# Patient Record
Sex: FEMALE | Race: WHITE | Employment: UNEMPLOYED | URBAN - METROPOLITAN AREA
[De-identification: names, ages, dates, MRNs, and addresses within clinical notes are randomized per-mention and may not be internally consistent; named-entity substitution may affect disease eponyms.]

---
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
## 2024-02-19 ENCOUNTER — TELEPHONE (OUTPATIENT)
Dept: FAMILY MEDICINE CLINIC | Age: 19
End: 2024-02-19

## 2024-02-20 ENCOUNTER — OFFICE VISIT (OUTPATIENT)
Dept: FAMILY MEDICINE CLINIC | Age: 19
End: 2024-02-20

## 2024-02-20 VITALS
WEIGHT: 167.3 LBS | HEIGHT: 65 IN | BODY MASS INDEX: 27.88 KG/M2 | HEART RATE: 104 BPM | SYSTOLIC BLOOD PRESSURE: 100 MMHG | DIASTOLIC BLOOD PRESSURE: 80 MMHG | RESPIRATION RATE: 97 BRPM

## 2024-02-20 DIAGNOSIS — K21.00 GASTROESOPHAGEAL REFLUX DISEASE WITH ESOPHAGITIS WITHOUT HEMORRHAGE: ICD-10-CM

## 2024-02-20 DIAGNOSIS — K20.0 EOSINOPHILIC ESOPHAGITIS: ICD-10-CM

## 2024-02-20 DIAGNOSIS — J45.40 MODERATE PERSISTENT ASTHMA WITHOUT COMPLICATION: ICD-10-CM

## 2024-02-20 DIAGNOSIS — F41.9 ANXIETY: Primary | ICD-10-CM

## 2024-02-20 DIAGNOSIS — Z59.9 FINANCIAL DIFFICULTIES: ICD-10-CM

## 2024-02-20 DIAGNOSIS — F32.A DEPRESSION, UNSPECIFIED DEPRESSION TYPE: ICD-10-CM

## 2024-02-20 DIAGNOSIS — J30.89 NON-SEASONAL ALLERGIC RHINITIS, UNSPECIFIED TRIGGER: ICD-10-CM

## 2024-02-20 PROCEDURE — 99213 OFFICE O/P EST LOW 20 MIN: CPT | Performed by: PHYSICIAN ASSISTANT

## 2024-02-20 RX ORDER — OMEPRAZOLE 20 MG/1
20 CAPSULE, DELAYED RELEASE ORAL 2 TIMES DAILY
Qty: 180 CAPSULE | Refills: 0 | Status: SHIPPED | OUTPATIENT
Start: 2024-02-20

## 2024-02-20 RX ORDER — ALBUTEROL SULFATE 90 UG/1
2 AEROSOL, METERED RESPIRATORY (INHALATION) EVERY 6 HOURS PRN
Qty: 1 EACH | Refills: 5 | Status: SHIPPED | OUTPATIENT
Start: 2024-02-20

## 2024-02-20 RX ORDER — MONTELUKAST SODIUM 10 MG/1
10 TABLET ORAL NIGHTLY
Qty: 90 TABLET | Refills: 1 | Status: SHIPPED | OUTPATIENT
Start: 2024-02-20

## 2024-02-20 RX ORDER — PAROXETINE 10 MG/1
10 TABLET, FILM COATED ORAL DAILY
Qty: 90 TABLET | Refills: 1 | Status: SHIPPED | OUTPATIENT
Start: 2024-02-20

## 2024-02-20 RX ORDER — FLUTICASONE PROPIONATE 50 MCG
SPRAY, SUSPENSION (ML) NASAL
Qty: 16 G | Refills: 0 | Status: SHIPPED | OUTPATIENT
Start: 2024-02-20

## 2024-02-20 SDOH — ECONOMIC STABILITY - INCOME SECURITY: PROBLEM RELATED TO HOUSING AND ECONOMIC CIRCUMSTANCES, UNSPECIFIED: Z59.9

## 2024-02-20 ASSESSMENT — PATIENT HEALTH QUESTIONNAIRE - PHQ9
1. LITTLE INTEREST OR PLEASURE IN DOING THINGS: 0
7. TROUBLE CONCENTRATING ON THINGS, SUCH AS READING THE NEWSPAPER OR WATCHING TELEVISION: 0
10. IF YOU CHECKED OFF ANY PROBLEMS, HOW DIFFICULT HAVE THESE PROBLEMS MADE IT FOR YOU TO DO YOUR WORK, TAKE CARE OF THINGS AT HOME, OR GET ALONG WITH OTHER PEOPLE: 0
SUM OF ALL RESPONSES TO PHQ QUESTIONS 1-9: 0
4. FEELING TIRED OR HAVING LITTLE ENERGY: 0
SUM OF ALL RESPONSES TO PHQ QUESTIONS 1-9: 0
2. FEELING DOWN, DEPRESSED OR HOPELESS: 0
8. MOVING OR SPEAKING SO SLOWLY THAT OTHER PEOPLE COULD HAVE NOTICED. OR THE OPPOSITE, BEING SO FIGETY OR RESTLESS THAT YOU HAVE BEEN MOVING AROUND A LOT MORE THAN USUAL: 0
SUM OF ALL RESPONSES TO PHQ QUESTIONS 1-9: 0
5. POOR APPETITE OR OVEREATING: 0
SUM OF ALL RESPONSES TO PHQ QUESTIONS 1-9: 0
9. THOUGHTS THAT YOU WOULD BE BETTER OFF DEAD, OR OF HURTING YOURSELF: 0
6. FEELING BAD ABOUT YOURSELF - OR THAT YOU ARE A FAILURE OR HAVE LET YOURSELF OR YOUR FAMILY DOWN: 0
SUM OF ALL RESPONSES TO PHQ9 QUESTIONS 1 & 2: 0
3. TROUBLE FALLING OR STAYING ASLEEP: 0

## 2024-02-20 ASSESSMENT — ENCOUNTER SYMPTOMS
CHEST TIGHTNESS: 1
WHEEZING: 0
SHORTNESS OF BREATH: 0

## 2024-02-20 NOTE — PATIENT INSTRUCTIONS

## 2024-02-20 NOTE — PROGRESS NOTES
Marlen Win (:  2005) is a 18 y.o. female,Established patient, here for evaluation of the following chief complaint(s):  Annual Exam         ASSESSMENT/PLAN:  1. Anxiety  -     PARoxetine (PAXIL) 10 MG tablet; Take 1 tablet by mouth daily, Disp-90 tablet, R-1Normal  -    uncontrolled, pt is not interested in medication at this time and would like to reach out to therapist  2. Depression, unspecified depression type  -     PARoxetine (PAXIL) 10 MG tablet; Take 1 tablet by mouth daily, Disp-90 tablet, R-1Normal  -    uncontrolled, pt is not interested in medication at this time and would like to reach out to therapist  3. Eosinophilic esophagitis  -     omeprazole (PRILOSEC) 20 MG delayed release capsule; Take 1 capsule by mouth 2 times daily, Disp-180 capsule, R-0Normal  4. Gastroesophageal reflux disease with esophagitis without hemorrhage  -     omeprazole (PRILOSEC) 20 MG delayed release capsule; Take 1 capsule by mouth 2 times daily, Disp-180 capsule, R-0Normal  5. Non-seasonal allergic rhinitis, unspecified trigger  -     montelukast (SINGULAIR) 10 MG tablet; Take 1 tablet by mouth nightly, Disp-90 tablet, R-1Normal  -     fluticasone (FLONASE) 50 MCG/ACT nasal spray; Use 1 spray(s) in each nostril once daily, Disp-16 g, R-0Normal  6. Moderate persistent asthma without complication  -     albuterol sulfate HFA (PROVENTIL;VENTOLIN;PROAIR) 108 (90 Base) MCG/ACT inhaler; Inhale 2 puffs into the lungs every 6 hours as needed for Wheezing, Disp-1 each, R-5Normal  -     beclomethasone (QVAR REDIHALER) 80 MCG/ACT AERB inhaler; Inhale 1 puff into the lungs in the morning and 1 puff in the evening., Disp-1 each, R-2Normal  -  flovent is no longer being made, will send in qvar for her to try at the pharmacy. She is having difficulty affording medication, I will get her connected with social work  7. Financial difficulties  -     [facility]      Return in about 6 months (around

## 2024-02-22 ENCOUNTER — TELEPHONE (OUTPATIENT)
Dept: FAMILY MEDICINE CLINIC | Age: 19
End: 2024-02-22

## 2024-02-22 NOTE — TELEPHONE ENCOUNTER
SW made 1st call attempt to reach patient to follow-up on [facility] referral from PCP.  Sw was unable to reach pt and left message explaining reason for call as well as contact number to return call.  PLAN: SW will attempt to reach pt another day if pt does not return call to SW.

## 2024-02-23 NOTE — TELEPHONE ENCOUNTER
[facility] Social Work Note    Reason for Referral   Behavioral Health - Substance Use Disorder / Stressors / Coping    Plan:    Pt will contact intake at [facility] at [phone] to request services for counseling for her ODD.      Pt will contact SW if needs additional resources.      Assessment/Summary:    SW contacted pt to return her call.  Pt related she has OCD and is on medication for anxiety through her PCP.  Pt related she would like counseling for her OCD.  Pt stated she had seen Nasir Khan at [facility] but is no longer at [facility]. Pt expressed she would like in person therapy rather than over the phone.  Pt stated she would rather have in person services.  SW provided pt with information about [facility] and how to reach agency for services.  SW also suggested [facility] in [place] if [facility] does not work out and how pt can obtain transportation to agencies through her [employer] insurance.  Pt is aware to contact SW if can assist her further with additional resources.      Interventions:    SW assessed pt's needs and barriers.    SW identified how pt wanted help with individual therapy and prefers in person therapy.    SW provided pt with information about [facility] and to call [phone] or to contact [facility] at [phone].    SW informed pt to reach back out to  if she needs further assistance.        Future Appointments   Date Time Provider Department Center   8/20/2024  1:30 PM Alanis Oglesby PA [facility] [place] [facility]

## 2024-03-07 NOTE — TELEPHONE ENCOUNTER
SW spoke with pt to follow-up and see if pt had reached out to agencies SW provided to her for therapy services.  PT related she had and was waiting to hear back from [facility].  PLAN: Pt related she would contact SW if needs further assistance if she does not receive services through [facility].

## 2024-05-15 DIAGNOSIS — K21.00 GASTROESOPHAGEAL REFLUX DISEASE WITH ESOPHAGITIS WITHOUT HEMORRHAGE: ICD-10-CM

## 2024-05-15 DIAGNOSIS — K20.0 EOSINOPHILIC ESOPHAGITIS: ICD-10-CM

## 2024-05-15 RX ORDER — OMEPRAZOLE 20 MG/1
20 CAPSULE, DELAYED RELEASE ORAL 2 TIMES DAILY
Qty: 180 CAPSULE | Refills: 1 | Status: SHIPPED | OUTPATIENT
Start: 2024-05-15

## 2024-05-15 NOTE — TELEPHONE ENCOUNTER
Refill Request     CONFIRM preferred pharmacy with the patient.    If Mail Order Rx - Pend for 90 day refill.      Last Seen: Last Seen Department: 2/20/2024  Last Seen by PCP: 2/20/2024    Last Written: 2/20/24 180 with no refills     If no future appointment scheduled:  Review the last OV with PCP and review information for follow-up visit,  Route STAFF MESSAGE with patient name to the  Pool for scheduling with the following information:            -  Timing of next visit           -  Visit type ie Physical, OV, etc           -  Diagnoses/Reason ie. COPD, HTN - Do not use MEDICATION, Follow-up or CHECK UP - Give reason for visit      Next Appointment:   Future Appointments   Date Time Provider Department Center   8/20/2024  1:30 PM Alanis Oglesby PA [facility]  [place] [facility]       Message sent to  to schedule appt with patient?  NO      Requested Prescriptions     Pending Prescriptions Disp Refills    omeprazole (PRILOSEC) 20 MG delayed release capsule [Pharmacy Med Name: Omeprazole 20 MG Oral Capsule Delayed Release] 180 capsule 0     Sig: Take 1 capsule by mouth twice daily        1 Principal Discharge DX:	 delivery delivered  Assessment and plan of treatment:	please follow up in 1 week for an incision check and 6 weeks for a postpartum visit

## 2024-05-16 ENCOUNTER — PATIENT MESSAGE (OUTPATIENT)
Dept: FAMILY MEDICINE CLINIC | Age: 19
End: 2024-05-16

## 2024-05-17 NOTE — TELEPHONE ENCOUNTER
From: Marlen Win  To: Alanis Oglesby  Sent: 5/16/2024 10:26 PM EDT  Subject: Daily inhaler change     Melo Thapa my insurance company will not paid for my daily inhaler so could you please call in something else for me thank you Marlen Win

## 2024-05-17 NOTE — TELEPHONE ENCOUNTER
Called pharmacy, spoke with meka she stated that when they run it through insurance they are covering it there is a $0.00 cost for patient  Albuterol HSA 90 mcg for 1 box is being covered

## 2024-08-14 DIAGNOSIS — J30.89 NON-SEASONAL ALLERGIC RHINITIS, UNSPECIFIED TRIGGER: ICD-10-CM

## 2024-08-14 RX ORDER — MONTELUKAST SODIUM 10 MG/1
10 TABLET ORAL NIGHTLY
Qty: 90 TABLET | Refills: 1 | Status: SHIPPED | OUTPATIENT
Start: 2024-08-14

## 2024-08-14 NOTE — TELEPHONE ENCOUNTER
Refill Request     CONFIRM preferred pharmacy with the patient.    If Mail Order Rx - Pend for 90 day refill.      Last Seen: Last Seen Department: 2/20/2024  Last Seen by PCP: 2/20/2024    Last Written: 2/20/24 #90 refills 1    If no future appointment scheduled:  Review the last OV with PCP and review information for follow-up visit,  Route STAFF MESSAGE with patient name to the  Pool for scheduling with the following information:            -  Timing of next visit           -  Visit type ie Physical, OV, etc           -  Diagnoses/Reason ie. COPD, HTN - Do not use MEDICATION, Follow-up or CHECK UP - Give reason for visit      Next Appointment:   Future Appointments   Date Time Provider Department Center   8/20/2024  1:30 PM Alanis Oglesby PA [facility]       Message sent to  to schedule appt with patient?  YES  Return in about 6 months (around 8/20/2024) for Asthma.     Requested Prescriptions     Pending Prescriptions Disp Refills    montelukast (SINGULAIR) 10 MG tablet [Pharmacy Med Name: Montelukast Sodium 10 MG Oral Tablet] 90 tablet 0     Sig: Take 1 tablet by mouth nightly

## 2024-08-19 SDOH — ECONOMIC STABILITY: FOOD INSECURITY: WITHIN THE PAST 12 MONTHS, YOU WORRIED THAT YOUR FOOD WOULD RUN OUT BEFORE YOU GOT MONEY TO BUY MORE.: NEVER TRUE

## 2024-08-19 SDOH — ECONOMIC STABILITY: FOOD INSECURITY: WITHIN THE PAST 12 MONTHS, THE FOOD YOU BOUGHT JUST DIDN'T LAST AND YOU DIDN'T HAVE MONEY TO GET MORE.: NEVER TRUE

## 2024-08-19 SDOH — ECONOMIC STABILITY: INCOME INSECURITY: HOW HARD IS IT FOR YOU TO PAY FOR THE VERY BASICS LIKE FOOD, HOUSING, MEDICAL CARE, AND HEATING?: NOT HARD AT ALL

## 2024-08-20 ENCOUNTER — OFFICE VISIT (OUTPATIENT)
Dept: FAMILY MEDICINE CLINIC | Age: 19
End: 2024-08-20
Payer: MEDICAID

## 2024-08-20 VITALS
SYSTOLIC BLOOD PRESSURE: 110 MMHG | HEART RATE: 86 BPM | WEIGHT: 169.4 LBS | BODY MASS INDEX: 28.57 KG/M2 | OXYGEN SATURATION: 99 % | DIASTOLIC BLOOD PRESSURE: 60 MMHG

## 2024-08-20 DIAGNOSIS — J45.40 MODERATE PERSISTENT ASTHMA WITHOUT COMPLICATION: ICD-10-CM

## 2024-08-20 DIAGNOSIS — J30.89 NON-SEASONAL ALLERGIC RHINITIS, UNSPECIFIED TRIGGER: ICD-10-CM

## 2024-08-20 DIAGNOSIS — F41.9 ANXIETY: Primary | ICD-10-CM

## 2024-08-20 DIAGNOSIS — F32.A DEPRESSION, UNSPECIFIED DEPRESSION TYPE: ICD-10-CM

## 2024-08-20 DIAGNOSIS — H65.92 FLUID LEVEL BEHIND TYMPANIC MEMBRANE OF LEFT EAR: ICD-10-CM

## 2024-08-20 PROCEDURE — G8427 DOCREV CUR MEDS BY ELIG CLIN: HCPCS | Performed by: PHYSICIAN ASSISTANT

## 2024-08-20 PROCEDURE — 99214 OFFICE O/P EST MOD 30 MIN: CPT | Performed by: PHYSICIAN ASSISTANT

## 2024-08-20 PROCEDURE — 1036F TOBACCO NON-USER: CPT | Performed by: PHYSICIAN ASSISTANT

## 2024-08-20 PROCEDURE — 90471 IMMUNIZATION ADMIN: CPT | Performed by: PHYSICIAN ASSISTANT

## 2024-08-20 PROCEDURE — 90677 PCV20 VACCINE IM: CPT | Performed by: PHYSICIAN ASSISTANT

## 2024-08-20 PROCEDURE — G8419 CALC BMI OUT NRM PARAM NOF/U: HCPCS | Performed by: PHYSICIAN ASSISTANT

## 2024-08-20 RX ORDER — FLUTICASONE PROPIONATE 50 MCG
SPRAY, SUSPENSION (ML) NASAL
Qty: 16 G | Refills: 0 | Status: SHIPPED | OUTPATIENT
Start: 2024-08-20

## 2024-08-20 RX ORDER — METHYLPREDNISOLONE 4 MG/1
TABLET ORAL
Qty: 1 KIT | Refills: 0 | Status: SHIPPED | OUTPATIENT
Start: 2024-08-20

## 2024-08-20 RX ORDER — ALBUTEROL SULFATE 90 UG/1
2 AEROSOL, METERED RESPIRATORY (INHALATION) EVERY 6 HOURS PRN
Qty: 1 EACH | Refills: 5 | Status: SHIPPED | OUTPATIENT
Start: 2024-08-20

## 2024-08-20 RX ORDER — PAROXETINE 10 MG/1
10 TABLET, FILM COATED ORAL DAILY
Qty: 90 TABLET | Refills: 1 | Status: SHIPPED | OUTPATIENT
Start: 2024-08-20

## 2024-08-20 ASSESSMENT — ENCOUNTER SYMPTOMS
SINUS PRESSURE: 1
RHINORRHEA: 0
SINUS PAIN: 0
COUGH: 0
SORE THROAT: 0

## 2024-08-20 NOTE — ASSESSMENT & PLAN NOTE
Borderline controlled, pt does not want to increase paxil at this time, will continue with paxil and therapy and follow up in 6 months    Orders:    PARoxetine (PAXIL) 10 MG tablet; Take 1 tablet by mouth daily

## 2024-08-20 NOTE — ASSESSMENT & PLAN NOTE
At goal, continue with qvar and albuterol prn, follow up with me in 6 months    Orders:    albuterol sulfate HFA (PROVENTIL;VENTOLIN;PROAIR) 108 (90 Base) MCG/ACT inhaler; Inhale 2 puffs into the lungs every 6 hours as needed for Wheezing

## 2024-08-20 NOTE — ASSESSMENT & PLAN NOTE
Well-controlled, continue current medications and continue with flonase and singulair, follow up in 6 months    Orders:    fluticasone (FLONASE) 50 MCG/ACT nasal spray; Use 1 spray(s) in each nostril once daily

## 2024-08-20 NOTE — PROGRESS NOTES
Marlen Win (:  2005) is a 19 y.o. female,Established patient, here for evaluation of the following chief complaint(s):  Asthma, Otalgia (On and off ), and Dizziness (On and off )         Assessment & Plan  Anxiety   Borderline controlled, pt does not want to increase paxil at this time, will continue with paxil and therapy and follow up in 6 months    Orders:    PARoxetine (PAXIL) 10 MG tablet; Take 1 tablet by mouth daily    Moderate persistent asthma without complication   At goal, continue with qvar and albuterol prn, follow up with me in 6 months    Orders:    albuterol sulfate HFA (PROVENTIL;VENTOLIN;PROAIR) 108 (90 Base) MCG/ACT inhaler; Inhale 2 puffs into the lungs every 6 hours as needed for Wheezing    Depression, unspecified depression type   Borderline controlled, pt does not want to increase paxil at this time. We will continue with paxil and therapy    Orders:    PARoxetine (PAXIL) 10 MG tablet; Take 1 tablet by mouth daily    Non-seasonal allergic rhinitis, unspecified trigger   Well-controlled, continue current medications and continue with flonase and singulair, follow up in 6 months    Orders:    fluticasone (FLONASE) 50 MCG/ACT nasal spray; Use 1 spray(s) in each nostril once daily    Fluid level behind tympanic membrane of left ear    Start medrol dose pack, let me know if there is no change and we can add on an antibiotic           Return in about 6 months (around 2025) for Asthma.       Subjective   HPI  Mood:  Current medication: paxil 10 mg  Side effects: none  Current symptoms: OCD, anxiety, panic attacks ( 1-2 times per month), trouble falling asleep, slight sadness  Denies: self harming, SI/HI, decreased motivation  She is seeing Jessi weekly for therapy     Asthma:  Current medication: albuterol, flonase, singulair, qvar  Aggravated by allergies  Albuterol inhaler: a couple times per week  Nocturnal symptoms: none  Current symptoms: chest tightness    Right ear

## 2024-08-20 NOTE — ASSESSMENT & PLAN NOTE
Borderline controlled, pt does not want to increase paxil at this time. We will continue with paxil and therapy    Orders:    PARoxetine (PAXIL) 10 MG tablet; Take 1 tablet by mouth daily

## 2024-08-21 ASSESSMENT — ENCOUNTER SYMPTOMS
WHEEZING: 0
SHORTNESS OF BREATH: 0

## 2024-11-07 DIAGNOSIS — K20.0 EOSINOPHILIC ESOPHAGITIS: ICD-10-CM

## 2024-11-07 DIAGNOSIS — K21.00 GASTROESOPHAGEAL REFLUX DISEASE WITH ESOPHAGITIS WITHOUT HEMORRHAGE: ICD-10-CM

## 2024-11-07 NOTE — TELEPHONE ENCOUNTER
Refill Request     CONFIRM preferred pharmacy with the patient.    If Mail Order Rx - Pend for 90 day refill.      Last Seen: Last Seen Department: 8/20/2024  Last Seen by PCP: 8/20/2024    Last Written: 5/15/2024    If no future appointment scheduled:  Review the last OV with PCP and review information for follow-up visit,  Route STAFF MESSAGE with patient name to the  Pool for scheduling with the following information:            -  Timing of next visit           -  Visit type ie Physical, OV, etc           -  Diagnoses/Reason ie. COPD, HTN - Do not use MEDICATION, Follow-up or CHECK UP - Give reason for visit      Next Appointment:   No future appointments.    Message sent to  to schedule appt with patient?  NO      Requested Prescriptions     Pending Prescriptions Disp Refills    omeprazole (PRILOSEC) 20 MG delayed release capsule [Pharmacy Med Name: OMEPRAZOLE 20MG CAP] 180 capsule 1     Sig: Take 1 capsule by mouth twice daily

## 2024-11-10 NOTE — TELEPHONE ENCOUNTER
Refill Request     CONFIRM preferred pharmacy with the patient.    If Mail Order Rx - Pend for 90 day refill.      Last Seen: Last Seen Department: 8/20/2024  Last Seen by PCP: 8/20/2024    Last Written: 5/20/24 10.6g 2 refills    If no future appointment scheduled:  Review the last OV with PCP and review information for follow-up visit,  Route STAFF MESSAGE with patient name to the  Pool for scheduling with the following information:            -  Timing of next visit           -  Visit type ie Physical, OV, etc           -  Diagnoses/Reason ie. COPD, HTN - Do not use MEDICATION, Follow-up or CHECK UP - Give reason for visit      Next Appointment:   No future appointments.    Message sent to  to schedule appt with patient?  YES  Return in about 6 months (around 2/20/2025) for Asthma.        Requested Prescriptions     Pending Prescriptions Disp Refills    beclomethasone (QVAR REDIHALER) 40 MCG/ACT AERB inhaler [Pharmacy Med Name: Qvar RediHaler 40 MCG/ACT Inhalation Aerosol Breath Activated] 11 g 0     Sig: INHALE 1 PUFF IN THE MORNING AND 1 IN THE EVENING

## 2025-01-24 DIAGNOSIS — J45.40 MODERATE PERSISTENT ASTHMA WITHOUT COMPLICATION: ICD-10-CM

## 2025-01-24 RX ORDER — ALBUTEROL SULFATE 90 UG/1
2 INHALANT RESPIRATORY (INHALATION) EVERY 6 HOURS PRN
Qty: 9 G | Refills: 0 | Status: SHIPPED | OUTPATIENT
Start: 2025-01-24

## 2025-01-24 NOTE — TELEPHONE ENCOUNTER
.Refill Request     CONFIRM preferred pharmacy with the patient.    If Mail Order Rx - Pend for 90 day refill.      Last Seen: Last Seen Department: 8/20/2024  Last Seen by PCP: 8/20/2024    Last Written: 8-20-24 1 with 5     If no future appointment scheduled:  Review the last OV with PCP and review information for follow-up visit,  Route STAFF MESSAGE with patient name to the  Pool for scheduling with the following information:            -  Timing of next visit           -  Visit type ie Physical, OV, etc           -  Diagnoses/Reason ie. COPD, HTN - Do not use MEDICATION, Follow-up or CHECK UP - Give reason for visit      Next Appointment:   Future Appointments   Date Time Provider Department Center   2/17/2025  1:30 PM Alanis Oglesby PA [facility] DE[facility]       Message sent to  to schedule appt with patient?  NO      Requested Prescriptions     Pending Prescriptions Disp Refills    albuterol sulfate HFA (PROVENTIL;VENTOLIN;PROAIR) 108 (90 Base) MCG/ACT inhaler [Pharmacy Med Name: Albuterol Sulfate  (90 Base) MCG/ACT Inhalation Aerosol Solution] 9 g 0     Sig: INHALE 2 PUFFS BY MOUTH EVERY 6 HOURS AS NEEDED FOR WHEEZING

## 2025-02-05 DIAGNOSIS — J30.89 NON-SEASONAL ALLERGIC RHINITIS, UNSPECIFIED TRIGGER: ICD-10-CM

## 2025-02-05 RX ORDER — MONTELUKAST SODIUM 10 MG/1
10 TABLET ORAL NIGHTLY
Qty: 90 TABLET | Refills: 1 | Status: SHIPPED | OUTPATIENT
Start: 2025-02-05

## 2025-02-05 NOTE — TELEPHONE ENCOUNTER
.Refill Request     CONFIRM preferred pharmacy with the patient.    If Mail Order Rx - Pend for 90 day refill.      Last Seen: Last Seen Department: 8/20/2024  Last Seen by PCP: 8/20/2024    Last Written: 8-14-24 90 with 1     If no future appointment scheduled:  Review the last OV with PCP and review information for follow-up visit,  Route STAFF MESSAGE with patient name to the  Pool for scheduling with the following information:            -  Timing of next visit           -  Visit type ie Physical, OV, etc           -  Diagnoses/Reason ie. COPD, HTN - Do not use MEDICATION, Follow-up or CHECK UP - Give reason for visit      Next Appointment:   Future Appointments   Date Time Provider Department Center   2/17/2025  1:30 PM Alanis Oglesby PA [facility] DEP       Message sent to  to schedule appt with patient?  NO      Requested Prescriptions     Pending Prescriptions Disp Refills    montelukast (SINGULAIR) 10 MG tablet [Pharmacy Med Name: Montelukast Sodium 10 MG Oral Tablet] 90 tablet 1     Sig: Take 1 tablet by mouth nightly

## 2025-02-08 DIAGNOSIS — J45.40 MODERATE PERSISTENT ASTHMA WITHOUT COMPLICATION: Primary | ICD-10-CM

## 2025-02-08 NOTE — TELEPHONE ENCOUNTER
Refill Request     CONFIRM preferred pharmacy with the patient.    If Mail Order Rx - Pend for 90 day refill.      Last Seen: Last Seen Department: 8/20/2024  Last Seen by PCP: 8/20/2024    Last Written: 11/11/2024 Qvar 10.6g with 2 refills    If no future appointment scheduled:  Review the last OV with PCP and review information for follow-up visit,  Route STAFF MESSAGE with patient name to the  Pool for scheduling with the following information:            -  Timing of next visit           -  Visit type ie Physical, OV, etc           -  Diagnoses/Reason ie. COPD, HTN - Do not use MEDICATION, Follow-up or CHECK UP - Give reason for visit      Next Appointment:   Future Appointments   Date Time Provider Department Center   2/17/2025  1:30 PM Alanis Oglesby PA [facility] DE[facility]       Message sent to  to schedule appt with patient?  NO      Requested Prescriptions     Pending Prescriptions Disp Refills    beclomethasone (QVAR REDIHALER) 40 MCG/ACT AERB inhaler [Pharmacy Med Name: Qvar RediHaler 40 MCG/ACT Inhalation Aerosol Breath Activated] 10.6 g 2     Sig: INHALE 1 PUFF BY MOUTH IN THE MORNING AND 1 IN THE EVENING

## 2025-02-14 DIAGNOSIS — J45.40 MODERATE PERSISTENT ASTHMA WITHOUT COMPLICATION: ICD-10-CM

## 2025-02-14 RX ORDER — ALBUTEROL SULFATE 90 UG/1
2 INHALANT RESPIRATORY (INHALATION) EVERY 6 HOURS PRN
Qty: 9 G | Refills: 4 | Status: SHIPPED | OUTPATIENT
Start: 2025-02-14

## 2025-02-14 NOTE — TELEPHONE ENCOUNTER
.Refill Request     CONFIRM preferred pharmacy with the patient.    If Mail Order Rx - Pend for 90 day refill.      Last Seen: Last Seen Department: 8/20/2024  Last Seen by PCP: 8/20/2024    Last Written: 1-24-25 9g with 0     If no future appointment scheduled:  Review the last OV with PCP and review information for follow-up visit,  Route STAFF MESSAGE with patient name to the  Pool for scheduling with the following information:            -  Timing of next visit           -  Visit type ie Physical, OV, etc           -  Diagnoses/Reason ie. COPD, HTN - Do not use MEDICATION, Follow-up or CHECK UP - Give reason for visit      Next Appointment:   Future Appointments   Date Time Provider Department Center   2/17/2025  1:30 PM Alanis Oglesby PA [facility] DE[facility]       Message sent to  to schedule appt with patient?  NO      Requested Prescriptions     Pending Prescriptions Disp Refills    albuterol sulfate HFA (PROVENTIL;VENTOLIN;PROAIR) 108 (90 Base) MCG/ACT inhaler [Pharmacy Med Name: Albuterol Sulfate  (90 Base) MCG/ACT Inhalation Aerosol Solution] 9 g 0     Sig: INHALE 2 PUFFS BY MOUTH EVERY 6 HOURS AS NEEDED FOR WHEEZING

## 2025-02-16 SDOH — ECONOMIC STABILITY: FOOD INSECURITY: WITHIN THE PAST 12 MONTHS, YOU WORRIED THAT YOUR FOOD WOULD RUN OUT BEFORE YOU GOT MONEY TO BUY MORE.: NEVER TRUE

## 2025-02-16 SDOH — ECONOMIC STABILITY: FOOD INSECURITY: WITHIN THE PAST 12 MONTHS, THE FOOD YOU BOUGHT JUST DIDN'T LAST AND YOU DIDN'T HAVE MONEY TO GET MORE.: NEVER TRUE

## 2025-02-16 SDOH — ECONOMIC STABILITY: TRANSPORTATION INSECURITY
IN THE PAST 12 MONTHS, HAS THE LACK OF TRANSPORTATION KEPT YOU FROM MEDICAL APPOINTMENTS OR FROM GETTING MEDICATIONS?: NO

## 2025-02-16 SDOH — ECONOMIC STABILITY: INCOME INSECURITY: IN THE LAST 12 MONTHS, WAS THERE A TIME WHEN YOU WERE NOT ABLE TO PAY THE MORTGAGE OR RENT ON TIME?: NO

## 2025-02-16 ASSESSMENT — PATIENT HEALTH QUESTIONNAIRE - PHQ9
2. FEELING DOWN, DEPRESSED OR HOPELESS: SEVERAL DAYS
10. IF YOU CHECKED OFF ANY PROBLEMS, HOW DIFFICULT HAVE THESE PROBLEMS MADE IT FOR YOU TO DO YOUR WORK, TAKE CARE OF THINGS AT HOME, OR GET ALONG WITH OTHER PEOPLE: SOMEWHAT DIFFICULT
SUM OF ALL RESPONSES TO PHQ QUESTIONS 1-9: 9
9. THOUGHTS THAT YOU WOULD BE BETTER OFF DEAD, OR OF HURTING YOURSELF: NOT AT ALL
8. MOVING OR SPEAKING SO SLOWLY THAT OTHER PEOPLE COULD HAVE NOTICED. OR THE OPPOSITE - BEING SO FIDGETY OR RESTLESS THAT YOU HAVE BEEN MOVING AROUND A LOT MORE THAN USUAL: NOT AT ALL
SUM OF ALL RESPONSES TO PHQ QUESTIONS 1-9: 9
6. FEELING BAD ABOUT YOURSELF - OR THAT YOU ARE A FAILURE OR HAVE LET YOURSELF OR YOUR FAMILY DOWN: SEVERAL DAYS
SUM OF ALL RESPONSES TO PHQ QUESTIONS 1-9: 9
6. FEELING BAD ABOUT YOURSELF - OR THAT YOU ARE A FAILURE OR HAVE LET YOURSELF OR YOUR FAMILY DOWN: SEVERAL DAYS
4. FEELING TIRED OR HAVING LITTLE ENERGY: SEVERAL DAYS
9. THOUGHTS THAT YOU WOULD BE BETTER OFF DEAD, OR OF HURTING YOURSELF: NOT AT ALL
3. TROUBLE FALLING OR STAYING ASLEEP: NOT AT ALL
DEPRESSION UNABLE TO ASSESS: FUNCTIONAL CAPACITY MOTIVATION LIMITS ACCURACY
4. FEELING TIRED OR HAVING LITTLE ENERGY: SEVERAL DAYS
5. POOR APPETITE OR OVEREATING: NEARLY EVERY DAY
SUM OF ALL RESPONSES TO PHQ9 QUESTIONS 1 & 2: 3
SUM OF ALL RESPONSES TO PHQ QUESTIONS 1-9: 9
5. POOR APPETITE OR OVEREATING: NEARLY EVERY DAY
7. TROUBLE CONCENTRATING ON THINGS, SUCH AS READING THE NEWSPAPER OR WATCHING TELEVISION: SEVERAL DAYS
7. TROUBLE CONCENTRATING ON THINGS, SUCH AS READING THE NEWSPAPER OR WATCHING TELEVISION: SEVERAL DAYS
1. LITTLE INTEREST OR PLEASURE IN DOING THINGS: MORE THAN HALF THE DAYS
8. MOVING OR SPEAKING SO SLOWLY THAT OTHER PEOPLE COULD HAVE NOTICED. OR THE OPPOSITE, BEING SO FIGETY OR RESTLESS THAT YOU HAVE BEEN MOVING AROUND A LOT MORE THAN USUAL: NOT AT ALL
3. TROUBLE FALLING OR STAYING ASLEEP: NOT AT ALL
2. FEELING DOWN, DEPRESSED OR HOPELESS: SEVERAL DAYS
10. IF YOU CHECKED OFF ANY PROBLEMS, HOW DIFFICULT HAVE THESE PROBLEMS MADE IT FOR YOU TO DO YOUR WORK, TAKE CARE OF THINGS AT HOME, OR GET ALONG WITH OTHER PEOPLE: SOMEWHAT DIFFICULT
1. LITTLE INTEREST OR PLEASURE IN DOING THINGS: MORE THAN HALF THE DAYS
SUM OF ALL RESPONSES TO PHQ QUESTIONS 1-9: 9

## 2025-02-17 ENCOUNTER — OFFICE VISIT (OUTPATIENT)
Dept: FAMILY MEDICINE CLINIC | Age: 20
End: 2025-02-17
Payer: MEDICAID

## 2025-02-17 ENCOUNTER — HOSPITAL ENCOUNTER (OUTPATIENT)
Dept: GENERAL RADIOLOGY | Age: 20
Discharge: HOME | End: 2025-02-17
Payer: MEDICAID

## 2025-02-17 VITALS
HEIGHT: 64 IN | OXYGEN SATURATION: 98 % | BODY MASS INDEX: 34.31 KG/M2 | DIASTOLIC BLOOD PRESSURE: 80 MMHG | SYSTOLIC BLOOD PRESSURE: 120 MMHG | WEIGHT: 201 LBS | HEART RATE: 103 BPM

## 2025-02-17 DIAGNOSIS — J45.40 MODERATE PERSISTENT ASTHMA WITHOUT COMPLICATION: ICD-10-CM

## 2025-02-17 DIAGNOSIS — K21.00 GASTROESOPHAGEAL REFLUX DISEASE WITH ESOPHAGITIS WITHOUT HEMORRHAGE: ICD-10-CM

## 2025-02-17 DIAGNOSIS — J01.00 ACUTE NON-RECURRENT MAXILLARY SINUSITIS: ICD-10-CM

## 2025-02-17 DIAGNOSIS — F41.9 ANXIETY: ICD-10-CM

## 2025-02-17 DIAGNOSIS — F32.A DEPRESSION, UNSPECIFIED DEPRESSION TYPE: ICD-10-CM

## 2025-02-17 DIAGNOSIS — M25.472 LEFT ANKLE SWELLING: ICD-10-CM

## 2025-02-17 DIAGNOSIS — K20.0 EOSINOPHILIC ESOPHAGITIS: ICD-10-CM

## 2025-02-17 DIAGNOSIS — M25.472 LEFT ANKLE SWELLING: Primary | ICD-10-CM

## 2025-02-17 DIAGNOSIS — J30.89 NON-SEASONAL ALLERGIC RHINITIS, UNSPECIFIED TRIGGER: ICD-10-CM

## 2025-02-17 PROCEDURE — 73610 X-RAY EXAM OF ANKLE: CPT

## 2025-02-17 PROCEDURE — G8417 CALC BMI ABV UP PARAM F/U: HCPCS | Performed by: PHYSICIAN ASSISTANT

## 2025-02-17 PROCEDURE — G8427 DOCREV CUR MEDS BY ELIG CLIN: HCPCS | Performed by: PHYSICIAN ASSISTANT

## 2025-02-17 PROCEDURE — 99214 OFFICE O/P EST MOD 30 MIN: CPT | Performed by: PHYSICIAN ASSISTANT

## 2025-02-17 PROCEDURE — 1036F TOBACCO NON-USER: CPT | Performed by: PHYSICIAN ASSISTANT

## 2025-02-17 RX ORDER — AMOXICILLIN 875 MG/1
875 TABLET, COATED ORAL 2 TIMES DAILY
Qty: 20 TABLET | Refills: 0 | Status: SHIPPED | OUTPATIENT
Start: 2025-02-17 | End: 2025-02-27

## 2025-02-17 RX ORDER — PAROXETINE 10 MG/1
10 TABLET, FILM COATED ORAL DAILY
Qty: 90 TABLET | Refills: 1 | Status: SHIPPED | OUTPATIENT
Start: 2025-02-17

## 2025-02-17 RX ORDER — FLUTICASONE PROPIONATE 50 MCG
SPRAY, SUSPENSION (ML) NASAL
Qty: 16 G | Refills: 0 | Status: SHIPPED | OUTPATIENT
Start: 2025-02-17

## 2025-02-17 ASSESSMENT — ENCOUNTER SYMPTOMS
SHORTNESS OF BREATH: 0
COUGH: 0
BLOOD IN STOOL: 0
ABDOMINAL PAIN: 0
CHEST TIGHTNESS: 1
SINUS PRESSURE: 0
COLOR CHANGE: 0
ABDOMINAL DISTENTION: 0
WHEEZING: 0
CONSTIPATION: 0
SORE THROAT: 1

## 2025-02-17 NOTE — ASSESSMENT & PLAN NOTE
Chronic, at goal (stable), continue with paxil, pt would like to stay on this medication for now. We will follow back up in 6 months    Orders:    PARoxetine (PAXIL) 10 MG tablet; Take 1 tablet by mouth daily

## 2025-02-17 NOTE — PROGRESS NOTES
Marlen Win (:  2005) is a 19 y.o. female,Established patient, here for evaluation of the following chief complaint(s):  Asthma (6 month follow up ), Depression, Sinusitis (X's 3 days ), and Joint Swelling (Left ankle, complains of swelling, x's 5 days, no known injury )         Assessment & Plan  Non-seasonal allergic rhinitis, unspecified trigger   Chronic, at goal (stable), continue with flonase, follow up in 6 months    Orders:    fluticasone (FLONASE) 50 MCG/ACT nasal spray; Use 1 spray(s) in each nostril once daily    Left ankle swelling   Acute condition, new, acutely tender to palpation, will get imaging    Orders:    XR ANKLE LEFT (2 VIEWS); Future    Acute non-recurrent maxillary sinusitis   Acute condition, new, will start her on amoxicillin, follow up if symptoms do not improve    Orders:    amoxicillin (AMOXIL) 875 MG tablet; Take 1 tablet by mouth 2 times daily for 10 days    Moderate persistent asthma without complication   Chronic, at goal (stable), continue with qvar and albuterol prn, follow up in 6 months         Anxiety   Chronic, at goal (stable), continue with paxil, pt would like to stay on this medication for now. We will follow back up in 6 months    Orders:    PARoxetine (PAXIL) 10 MG tablet; Take 1 tablet by mouth daily    Depression, unspecified depression type   Chronic, at goal (stable), continue with paxil, follow up in 6 months    Orders:    PARoxetine (PAXIL) 10 MG tablet; Take 1 tablet by mouth daily    Eosinophilic esophagitis   Chronic, at goal (stable), continue with prilosec, follow up in 6 months         Gastroesophageal reflux disease with esophagitis without hemorrhage   Chronic, at goal (stable), continue with prilosec, follow up in 6 months           Return in about 6 months (around 2025).       Subjective   HPI  Asthma  Current medication: qvar  Side effects: none  Albuterol use: once in the last month  Nocturnal symptoms: more issues before

## 2025-02-17 NOTE — ASSESSMENT & PLAN NOTE
Chronic, at goal (stable), continue with paxil, follow up in 6 months    Orders:    PARoxetine (PAXIL) 10 MG tablet; Take 1 tablet by mouth daily

## 2025-02-17 NOTE — ASSESSMENT & PLAN NOTE
Chronic, at goal (stable), continue with flonase, follow up in 6 months    Orders:    fluticasone (FLONASE) 50 MCG/ACT nasal spray; Use 1 spray(s) in each nostril once daily

## 2025-02-22 ENCOUNTER — HOSPITAL ENCOUNTER (EMERGENCY)
Age: 20
Discharge: HOME | End: 2025-02-22
Attending: EMERGENCY MEDICINE
Payer: MEDICAID

## 2025-02-22 VITALS
DIASTOLIC BLOOD PRESSURE: 81 MMHG | WEIGHT: 204 LBS | TEMPERATURE: 97.9 F | BODY MASS INDEX: 34.83 KG/M2 | HEART RATE: 89 BPM | RESPIRATION RATE: 18 BRPM | HEIGHT: 64 IN | SYSTOLIC BLOOD PRESSURE: 118 MMHG | OXYGEN SATURATION: 100 %

## 2025-02-22 DIAGNOSIS — F41.9 ANXIETY: Primary | ICD-10-CM

## 2025-02-22 PROCEDURE — 6370000000 HC RX 637 (ALT 250 FOR IP): Performed by: EMERGENCY MEDICINE

## 2025-02-22 PROCEDURE — 99283 EMERGENCY DEPT VISIT LOW MDM: CPT

## 2025-02-22 RX ORDER — LORAZEPAM 2 MG/1
2 TABLET ORAL ONCE
Status: COMPLETED | OUTPATIENT
Start: 2025-02-22 | End: 2025-02-22

## 2025-02-22 RX ORDER — HYDROXYZINE HYDROCHLORIDE 50 MG/1
50 TABLET, FILM COATED ORAL ONCE
Status: COMPLETED | OUTPATIENT
Start: 2025-02-22 | End: 2025-02-22

## 2025-02-22 RX ORDER — HYDROXYZINE PAMOATE 50 MG/1
50 CAPSULE ORAL ONCE
Status: DISCONTINUED | OUTPATIENT
Start: 2025-02-22 | End: 2025-02-22 | Stop reason: CLARIF

## 2025-02-22 RX ADMIN — LORAZEPAM 2 MG: 2 TABLET ORAL at 20:49

## 2025-02-22 RX ADMIN — HYDROXYZINE HYDROCHLORIDE 50 MG: 50 TABLET ORAL at 20:14

## 2025-02-22 ASSESSMENT — PAIN - FUNCTIONAL ASSESSMENT
PAIN_FUNCTIONAL_ASSESSMENT: NONE - DENIES PAIN
PAIN_FUNCTIONAL_ASSESSMENT: NONE - DENIES PAIN

## 2025-02-23 NOTE — ED PROVIDER NOTES
normally mineralized.  There are no bony destructive lesions.  Soft tissue swelling surrounds the ankle.     1. No acute abnormality.         Bedside Ultrasound, as interpreted by me, if performed:    No results found.    PROCEDURES     Unless otherwise noted below, none     Procedures    CRITICAL CARE TIME     I personally spent a total of 0 minutes of critical care time in obtaining history, performing a physical exam, bedside monitoring of interventions, collecting and interpreting tests and discussion with consultants but excluding time spent performing procedures, treating other patients and teaching time.                                                                                                         EMERGENCY DEPARTMENT COURSE and DIFFERENTIAL DIAGNOSIS/MDM:     Patient seen and evaluated. At presentation, patient was awake, alert, afebrile, normotensive, tachycardic to 115, and satting well on room air.  Patient appears somewhat anxious.  She is shaking her legs nonstop.  Discussed stopping the amoxicillin if she feels like she no longer has a sinusitis.  She was agreeable with trying hydroxyzine for symptoms.  On reassessment, I saw her walking to the bathroom.  Her symptoms appear improved.  She reports feeling better, but reports she is still shaking and cannot seem to stop.  She was given one-time dose of Ativan after discussion with her and her family member.  After the Ativan, she appears much improved.  Heart rate improved to 89.  She reports her anxiety has improved.  Her tremor has also improved.  She was comfortable with plan for discharge home and will follow-up with her PCP.  Reports having hydroxyzine that she can try as needed at home.  Discharged home with strict return precautions.    CONSULTS: (Who and What was discussed)  None    Is this patient to be included in the SEP-1 Core Measure due to severe sepsis or septic shock?   No     Exclusion criteria - the patient is NOT to be

## 2025-02-23 NOTE — ED NOTES
Pt states she is feeling better at this time and is ready to go home. Pt states she feels less shaky and denies any pain.

## 2025-02-23 NOTE — DISCHARGE INSTRUCTIONS
Close follow up with your PCP for further evaluation and treatment.   Take hydroxyzine as needed for symptoms.   If persistent or worsening symptoms, or if you have any concerns, return to ED immediately.

## 2025-02-24 ENCOUNTER — TELEPHONE (OUTPATIENT)
Dept: FAMILY MEDICINE CLINIC | Age: 20
End: 2025-02-24

## 2025-02-24 NOTE — TELEPHONE ENCOUNTER
Changed ED follow up from 2/25/25 945am to 11am.     Left message for the patient to call the office back to discuss appt change.

## 2025-02-25 ENCOUNTER — OFFICE VISIT (OUTPATIENT)
Dept: FAMILY MEDICINE CLINIC | Age: 20
End: 2025-02-25
Payer: MEDICAID

## 2025-02-25 VITALS
HEART RATE: 98 BPM | OXYGEN SATURATION: 98 % | SYSTOLIC BLOOD PRESSURE: 120 MMHG | BODY MASS INDEX: 34.5 KG/M2 | DIASTOLIC BLOOD PRESSURE: 70 MMHG | WEIGHT: 201 LBS

## 2025-02-25 DIAGNOSIS — R25.1 TREMOR: Primary | ICD-10-CM

## 2025-02-25 DIAGNOSIS — R25.1 TREMOR: ICD-10-CM

## 2025-02-25 LAB
ALBUMIN SERPL-MCNC: 4 G/DL (ref 3.4–5)
ALBUMIN/GLOB SERPL: 1.7 {RATIO} (ref 1.1–2.2)
ALP SERPL-CCNC: 104 U/L (ref 40–129)
ALT SERPL-CCNC: 32 U/L (ref 10–40)
ANION GAP SERPL CALCULATED.3IONS-SCNC: 10 MMOL/L (ref 3–16)
AST SERPL-CCNC: 15 U/L (ref 15–37)
BILIRUB SERPL-MCNC: 0.3 MG/DL (ref 0–1)
BUN SERPL-MCNC: 12 MG/DL (ref 7–20)
CALCIUM SERPL-MCNC: 9.7 MG/DL (ref 8.3–10.6)
CHLORIDE SERPL-SCNC: 105 MMOL/L (ref 99–110)
CO2 SERPL-SCNC: 28 MMOL/L (ref 21–32)
CREAT SERPL-MCNC: 0.5 MG/DL (ref 0.6–1.1)
DEPRECATED RDW RBC AUTO: 15 % (ref 12.4–15.4)
FOLATE SERPL-MCNC: 9.5 NG/ML (ref 4.78–24.2)
GFR SERPLBLD CREATININE-BSD FMLA CKD-EPI: >90 ML/MIN/{1.73_M2}
GLUCOSE SERPL-MCNC: 83 MG/DL (ref 70–99)
HCT VFR BLD AUTO: 39.2 % (ref 36–48)
HGB BLD-MCNC: 13.1 G/DL (ref 12–16)
MCH RBC QN AUTO: 28.8 PG (ref 26–34)
MCHC RBC AUTO-ENTMCNC: 33.4 G/DL (ref 31–36)
MCV RBC AUTO: 86.3 FL (ref 80–100)
PLATELET # BLD AUTO: 299 K/UL (ref 135–450)
PMV BLD AUTO: 9.9 FL (ref 5–10.5)
POTASSIUM SERPL-SCNC: 3.8 MMOL/L (ref 3.5–5.1)
PROT SERPL-MCNC: 6.4 G/DL (ref 6.4–8.2)
RBC # BLD AUTO: 4.54 M/UL (ref 4–5.2)
SODIUM SERPL-SCNC: 143 MMOL/L (ref 136–145)
TSH SERPL DL<=0.005 MIU/L-ACNC: 2.16 UIU/ML (ref 0.27–4.2)
VIT B12 SERPL-MCNC: 267 PG/ML (ref 211–911)
WBC # BLD AUTO: 7.4 K/UL (ref 4–11)

## 2025-02-25 PROCEDURE — 1036F TOBACCO NON-USER: CPT | Performed by: PHYSICIAN ASSISTANT

## 2025-02-25 PROCEDURE — G8427 DOCREV CUR MEDS BY ELIG CLIN: HCPCS | Performed by: PHYSICIAN ASSISTANT

## 2025-02-25 PROCEDURE — G8417 CALC BMI ABV UP PARAM F/U: HCPCS | Performed by: PHYSICIAN ASSISTANT

## 2025-02-25 PROCEDURE — 99213 OFFICE O/P EST LOW 20 MIN: CPT | Performed by: PHYSICIAN ASSISTANT

## 2025-02-25 NOTE — PROGRESS NOTES
Marlen Win (:  2005) is a 19 y.o. female,Established patient, here for evaluation of the following chief complaint(s):  ER follow up (25, Magalys Acosta, Anxiety/Shakes )         Assessment & Plan  Tremor    Normal labs, low normal B12, start on a supplement. Would proceed with brain MRI to rule out MS.     Orders:    TSH reflex to FT4; Future    Vitamin B12 & Folate; Future    CBC; Future    Comprehensive Metabolic Panel; Future      Return if symptoms worsen or fail to improve.       Subjective   HPI  The pt is here for ED follow up. She was seen on  for shaking in her legs that started while she was with friends. The shaking was so severe that it had become uncomfortable. They gave her Ativan in the hospital and noticed an improvement in the shaking. She admits that this was helpful however, since discharge, symptoms are back. She feels them shaking when standing but it is more pronounced with sitting. There is no weakness, color change, or back pain. She denies any injury. She is unsure if anxiety is the cause as she doesn't feel particularly anxious or worried about anything. She denies any assault or abuse.     Review of Systems   Constitutional:  Negative for fever.   Respiratory:  Negative for shortness of breath.    Cardiovascular:  Negative for chest pain, palpitations and leg swelling.   Musculoskeletal:  Negative for back pain and myalgias.   Skin:  Negative for color change.   Neurological:  Positive for tremors. Negative for dizziness, facial asymmetry, weakness, light-headedness and headaches.          Objective   Physical Exam  Vitals reviewed.   Constitutional:       Appearance: Normal appearance.   HENT:      Head: Normocephalic and atraumatic.   Eyes:      Pupils: Pupils are equal, round, and reactive to light.   Cardiovascular:      Rate and Rhythm: Normal rate and regular rhythm.      Heart sounds: Normal heart sounds.   Pulmonary:      Effort: Pulmonary effort is

## 2025-02-26 DIAGNOSIS — R25.1 TREMOR: Primary | ICD-10-CM

## 2025-02-26 ASSESSMENT — ENCOUNTER SYMPTOMS
SHORTNESS OF BREATH: 0
BACK PAIN: 0
COLOR CHANGE: 0

## 2025-03-09 ENCOUNTER — HOSPITAL ENCOUNTER (OUTPATIENT)
Dept: MRI IMAGING | Age: 20
Discharge: HOME | End: 2025-03-09
Payer: MEDICAID

## 2025-03-09 DIAGNOSIS — R25.1 TREMOR: ICD-10-CM

## 2025-03-09 PROCEDURE — 6360000004 HC RX CONTRAST MEDICATION: Performed by: PHYSICIAN ASSISTANT

## 2025-03-09 PROCEDURE — 70553 MRI BRAIN STEM W/O & W/DYE: CPT

## 2025-03-09 PROCEDURE — A9579 GAD-BASE MR CONTRAST NOS,1ML: HCPCS | Performed by: PHYSICIAN ASSISTANT

## 2025-03-09 RX ADMIN — GADOTERIDOL 19 ML: 279.3 INJECTION, SOLUTION INTRAVENOUS at 14:02

## 2025-03-12 ENCOUNTER — RESULTS FOLLOW-UP (OUTPATIENT)
Dept: MRI IMAGING | Age: 20
End: 2025-03-12

## 2025-03-12 NOTE — TELEPHONE ENCOUNTER
Patient notified of test results and to call the office with any further questions, verbally states she understands.

## 2025-05-08 DIAGNOSIS — K20.0 EOSINOPHILIC ESOPHAGITIS: ICD-10-CM

## 2025-05-08 DIAGNOSIS — K21.00 GASTROESOPHAGEAL REFLUX DISEASE WITH ESOPHAGITIS WITHOUT HEMORRHAGE: ICD-10-CM

## 2025-05-08 RX ORDER — OMEPRAZOLE 20 MG/1
20 CAPSULE, DELAYED RELEASE ORAL 2 TIMES DAILY
Qty: 180 CAPSULE | Refills: 0 | Status: SHIPPED | OUTPATIENT
Start: 2025-05-08

## 2025-05-08 NOTE — TELEPHONE ENCOUNTER
Refill Request   Last Seen: Last Seen Department: 2/25/2025  Last Seen by PCP: 2/25/2025    Last Written: 11/07/2024      Next Appointment:   Future Appointments   Date Time Provider Department Center   8/18/2025  1:00 PM Alanis Oglesby PA [facility] DEP     Requested Prescriptions     Pending Prescriptions Disp Refills    omeprazole (PRILOSEC) 20 MG delayed release capsule [Pharmacy Med Name: OMEPRAZOLE 20MG CAP] 180 capsule 0     Sig: Take 1 capsule by mouth twice daily

## 2025-06-09 ENCOUNTER — TELEPHONE (OUTPATIENT)
Dept: FAMILY MEDICINE CLINIC | Age: 20
End: 2025-06-09

## 2025-06-09 NOTE — TELEPHONE ENCOUNTER
Insurance is denying her qvar, can we check with the patient and see if she is okay with me sending in asmanex for her?

## 2025-06-11 DIAGNOSIS — J45.40 MODERATE PERSISTENT ASTHMA WITHOUT COMPLICATION: ICD-10-CM

## 2025-06-11 RX ORDER — ALBUTEROL SULFATE 90 UG/1
2 INHALANT RESPIRATORY (INHALATION) EVERY 6 HOURS PRN
Qty: 9 G | Refills: 4 | Status: SHIPPED | OUTPATIENT
Start: 2025-06-11

## 2025-06-11 NOTE — TELEPHONE ENCOUNTER
.Refill Request     CONFIRM preferred pharmacy with the patient.    If Mail Order Rx - Pend for 90 day refill.      Last Seen: Last Seen Department: 2/25/2025  Last Seen by PCP: 2/25/2025    Last Written: 2-14-25 9 g with 4     If no future appointment scheduled:  Review the last OV with PCP and review information for follow-up visit,  Route STAFF MESSAGE with patient name to the  Pool for scheduling with the following information:            -  Timing of next visit           -  Visit type ie Physical, OV, etc           -  Diagnoses/Reason ie. COPD, HTN - Do not use MEDICATION, Follow-up or CHECK UP - Give reason for visit      Next Appointment:   Future Appointments   Date Time Provider Department Center   8/18/2025  1:00 PM Alanis Oglesby PA [facility] DE[facility]       Message sent to  to schedule appt with patient?  NO      Requested Prescriptions     Pending Prescriptions Disp Refills    albuterol sulfate HFA (PROVENTIL;VENTOLIN;PROAIR) 108 (90 Base) MCG/ACT inhaler [Pharmacy Med Name: Albuterol Sulfate  (90 Base) MCG/ACT Inhalation Aerosol Solution] 9 g 0     Sig: INHALE 2 PUFFS BY MOUTH EVERY 6 HOURS AS NEEDED FOR WHEEZING

## 2025-07-01 ENCOUNTER — TELEMEDICINE (OUTPATIENT)
Dept: FAMILY MEDICINE CLINIC | Age: 20
End: 2025-07-01
Payer: MEDICAID

## 2025-07-01 DIAGNOSIS — F32.A DEPRESSION, UNSPECIFIED DEPRESSION TYPE: ICD-10-CM

## 2025-07-01 DIAGNOSIS — F41.9 ANXIETY: ICD-10-CM

## 2025-07-01 DIAGNOSIS — K21.00 GASTROESOPHAGEAL REFLUX DISEASE WITH ESOPHAGITIS WITHOUT HEMORRHAGE: ICD-10-CM

## 2025-07-01 DIAGNOSIS — J45.40 MODERATE PERSISTENT ASTHMA WITHOUT COMPLICATION: Primary | ICD-10-CM

## 2025-07-01 DIAGNOSIS — K20.0 EOSINOPHILIC ESOPHAGITIS: ICD-10-CM

## 2025-07-01 PROCEDURE — 99214 OFFICE O/P EST MOD 30 MIN: CPT | Performed by: PHYSICIAN ASSISTANT

## 2025-07-01 RX ORDER — OMEPRAZOLE 20 MG/1
20 CAPSULE, DELAYED RELEASE ORAL 2 TIMES DAILY
Qty: 180 CAPSULE | Refills: 1 | Status: SHIPPED | OUTPATIENT
Start: 2025-07-01

## 2025-07-01 RX ORDER — PAROXETINE 10 MG/1
10 TABLET, FILM COATED ORAL DAILY
Qty: 90 TABLET | Refills: 1 | Status: SHIPPED | OUTPATIENT
Start: 2025-07-01

## 2025-07-01 RX ORDER — MOMETASONE FUROATE 100 UG/1
2 AEROSOL RESPIRATORY (INHALATION) 2 TIMES DAILY
Qty: 13 G | Refills: 1 | Status: SHIPPED | OUTPATIENT
Start: 2025-07-01

## 2025-07-01 ASSESSMENT — ENCOUNTER SYMPTOMS
CHEST TIGHTNESS: 0
SHORTNESS OF BREATH: 0
WHEEZING: 0

## 2025-07-01 NOTE — ASSESSMENT & PLAN NOTE
Chronic, at goal (stable), continue with paxil. Follow up in 6 months    Orders:    PARoxetine (PAXIL) 10 MG tablet; Take 1 tablet by mouth daily

## 2025-07-01 NOTE — ASSESSMENT & PLAN NOTE
Chronic, at goal (stable), continue with prilosec, follow up in 6 months    Orders:    omeprazole (PRILOSEC) 20 MG delayed release capsule; Take 1 capsule by mouth 2 times daily

## 2025-07-01 NOTE — PROGRESS NOTES
Marlen Win, was evaluated through a synchronous (real-time) audio-video encounter. The patient (or guardian if applicable) is aware that this is a billable service, which includes applicable co-pays. This Virtual Visit was conducted with patient's (and/or legal guardian's) consent. Patient identification was verified, and a caregiver was present when appropriate.   The patient was located at Home: [address] [facility] [address]  Provider was located at Facility (Appt Dept): [address]  [address]  [place] [address]  Confirm you are appropriately licensed, registered, or certified to deliver care in the state where the patient is located as indicated above. If you are not or unsure, please re-schedule the visit: Yes, I confirm.     Marlen Win (:  2005) is a Established patient, presenting virtually for evaluation of the following:      Below is the assessment and plan developed based on review of pertinent history, physical exam, labs, studies, and medications.     Assessment & Plan  Eosinophilic esophagitis   Chronic, at goal (stable), continue with prilosec and follow up in 6 months    Orders:    omeprazole (PRILOSEC) 20 MG delayed release capsule; Take 1 capsule by mouth 2 times daily    Gastroesophageal reflux disease with esophagitis without hemorrhage   Chronic, at goal (stable), continue with prilosec, follow up in 6 months    Orders:    omeprazole (PRILOSEC) 20 MG delayed release capsule; Take 1 capsule by mouth 2 times daily    Anxiety   Chronic, at goal (stable), continue with paxil, follow up in 6 months    Orders:    PARoxetine (PAXIL) 10 MG tablet; Take 1 tablet by mouth daily    Depression, unspecified depression type   Chronic, at goal (stable), continue with paxil. Follow up in 6 months    Orders:    PARoxetine (PAXIL) 10 MG tablet; Take 1 tablet by mouth daily    Moderate persistent asthma without complication   Chronic, at goal (stable), qvar is no longer covered,

## 2025-07-01 NOTE — ASSESSMENT & PLAN NOTE
Chronic, at goal (stable), continue with prilosec and follow up in 6 months    Orders:    omeprazole (PRILOSEC) 20 MG delayed release capsule; Take 1 capsule by mouth 2 times daily

## 2025-07-01 NOTE — ASSESSMENT & PLAN NOTE
Chronic, at goal (stable), qvar is no longer covered, will send in asmanex. Medication risks, benefits and side effects discussed, follow up in 6 months    Orders:    mometasone (ASMANEX HFA) 100 MCG/ACT AERO inhaler; Inhale 2 puffs into the lungs 2 times daily

## 2025-07-31 ENCOUNTER — TELEPHONE (OUTPATIENT)
Dept: FAMILY MEDICINE CLINIC | Age: 20
End: 2025-07-31

## 2025-07-31 DIAGNOSIS — J45.40 MODERATE PERSISTENT ASTHMA WITHOUT COMPLICATION: Primary | ICD-10-CM

## 2025-08-04 DIAGNOSIS — J30.89 NON-SEASONAL ALLERGIC RHINITIS, UNSPECIFIED TRIGGER: ICD-10-CM

## 2025-08-04 RX ORDER — MONTELUKAST SODIUM 10 MG/1
10 TABLET ORAL NIGHTLY
Qty: 90 TABLET | Refills: 1 | Status: SHIPPED | OUTPATIENT
Start: 2025-08-04

## 2025-08-05 RX ORDER — FLUTICASONE PROPIONATE 110 UG/1
2 AEROSOL, METERED RESPIRATORY (INHALATION) 2 TIMES DAILY
Qty: 12 G | Refills: 3 | Status: SHIPPED | OUTPATIENT
Start: 2025-08-05 | End: 2026-08-05

## 2025-08-07 ENCOUNTER — OFFICE VISIT (OUTPATIENT)
Dept: FAMILY MEDICINE CLINIC | Age: 20
End: 2025-08-07
Payer: MEDICAID

## 2025-08-07 VITALS
HEART RATE: 100 BPM | SYSTOLIC BLOOD PRESSURE: 124 MMHG | BODY MASS INDEX: 34.31 KG/M2 | OXYGEN SATURATION: 99 % | WEIGHT: 201 LBS | HEIGHT: 64 IN | DIASTOLIC BLOOD PRESSURE: 72 MMHG

## 2025-08-07 DIAGNOSIS — K52.9 ACUTE GASTROENTERITIS: Primary | ICD-10-CM

## 2025-08-07 LAB
BILIRUBIN, POC: NEGATIVE
BLOOD URINE, POC: NEGATIVE
CLARITY, POC: CLEAR
COLOR, POC: NORMAL
CONTROL: 0
GLUCOSE URINE, POC: NEGATIVE MG/DL
KETONES, POC: NEGATIVE MG/DL
LEUKOCYTE EST, POC: NORMAL
NITRITE, POC: NEGATIVE
PH, POC: 5.5
PREGNANCY TEST URINE, POC: NEGATIVE
PROTEIN, POC: NEGATIVE MG/DL
SPECIFIC GRAVITY, POC: 1.02
UROBILINOGEN, POC: 0.2 MG/DL

## 2025-08-07 PROCEDURE — 99213 OFFICE O/P EST LOW 20 MIN: CPT

## 2025-08-07 PROCEDURE — 81002 URINALYSIS NONAUTO W/O SCOPE: CPT

## 2025-08-07 PROCEDURE — 81025 URINE PREGNANCY TEST: CPT

## 2025-08-07 RX ORDER — DICYCLOMINE HCL 20 MG
20 TABLET ORAL EVERY 8 HOURS
Qty: 21 TABLET | Refills: 0 | Status: SHIPPED | OUTPATIENT
Start: 2025-08-07 | End: 2025-08-14

## 2025-08-07 RX ORDER — ONDANSETRON 4 MG/1
4 TABLET, FILM COATED ORAL 3 TIMES DAILY PRN
Qty: 15 TABLET | Refills: 0 | Status: SHIPPED | OUTPATIENT
Start: 2025-08-07

## 2025-08-07 ASSESSMENT — ENCOUNTER SYMPTOMS
NAUSEA: 1
CHEST TIGHTNESS: 0
TROUBLE SWALLOWING: 0
COUGH: 0
ABDOMINAL PAIN: 1
VOMITING: 1
CONSTIPATION: 0
SINUS PRESSURE: 0
DIARRHEA: 0
SHORTNESS OF BREATH: 0

## 2025-08-09 LAB — BACTERIA UR CULT: NORMAL

## 2025-08-18 DIAGNOSIS — Z12.4 CERVICAL CANCER SCREENING: Primary | ICD-10-CM

## 2025-09-04 DIAGNOSIS — J30.89 NON-SEASONAL ALLERGIC RHINITIS, UNSPECIFIED TRIGGER: ICD-10-CM

## 2025-09-04 RX ORDER — FLUTICASONE PROPIONATE 50 MCG
SPRAY, SUSPENSION (ML) NASAL
Qty: 16 G | Refills: 1 | Status: SHIPPED | OUTPATIENT
Start: 2025-09-04